# Patient Record
Sex: MALE | Race: WHITE | NOT HISPANIC OR LATINO
[De-identification: names, ages, dates, MRNs, and addresses within clinical notes are randomized per-mention and may not be internally consistent; named-entity substitution may affect disease eponyms.]

---

## 2018-08-28 ENCOUNTER — RECORD ABSTRACTING (OUTPATIENT)
Age: 28
End: 2018-08-28

## 2018-08-28 DIAGNOSIS — Z79.4 LONG TERM (CURRENT) USE OF INSULIN: ICD-10-CM

## 2018-08-28 DIAGNOSIS — Z78.9 OTHER SPECIFIED HEALTH STATUS: ICD-10-CM

## 2018-08-28 DIAGNOSIS — F17.210 NICOTINE DEPENDENCE, CIGARETTES, UNCOMPLICATED: ICD-10-CM

## 2018-08-28 DIAGNOSIS — F17.200 NICOTINE DEPENDENCE, UNSPECIFIED, UNCOMPLICATED: ICD-10-CM

## 2018-08-28 RX ORDER — BLOOD-GLUCOSE METER
KIT MISCELLANEOUS
Refills: 0 | Status: ACTIVE | COMMUNITY

## 2018-08-28 RX ORDER — LANCETS
EACH MISCELLANEOUS
Refills: 0 | Status: ACTIVE | COMMUNITY

## 2018-09-13 ENCOUNTER — APPOINTMENT (OUTPATIENT)
Dept: ENDOCRINOLOGY | Facility: CLINIC | Age: 28
End: 2018-09-13
Payer: COMMERCIAL

## 2018-09-13 VITALS
WEIGHT: 168 LBS | HEIGHT: 68 IN | HEART RATE: 79 BPM | SYSTOLIC BLOOD PRESSURE: 130 MMHG | BODY MASS INDEX: 25.46 KG/M2 | DIASTOLIC BLOOD PRESSURE: 80 MMHG

## 2018-09-13 DIAGNOSIS — F12.188 CANNABIS ABUSE WITH OTHER CANNABIS-INDUCED DISORDER: ICD-10-CM

## 2018-09-13 LAB — GLUCOSE BLDC GLUCOMTR-MCNC: 241

## 2018-09-13 PROCEDURE — 82962 GLUCOSE BLOOD TEST: CPT

## 2018-09-13 PROCEDURE — 99214 OFFICE O/P EST MOD 30 MIN: CPT | Mod: 25

## 2018-09-13 RX ORDER — NAPROXEN SODIUM 220 MG
220 TABLET ORAL
Refills: 0 | Status: DISCONTINUED | COMMUNITY
End: 2018-09-13

## 2018-12-10 ENCOUNTER — RX RENEWAL (OUTPATIENT)
Age: 28
End: 2018-12-10

## 2019-01-22 ENCOUNTER — APPOINTMENT (OUTPATIENT)
Dept: ENDOCRINOLOGY | Facility: CLINIC | Age: 29
End: 2019-01-22

## 2019-03-11 ENCOUNTER — RX RENEWAL (OUTPATIENT)
Age: 29
End: 2019-03-11

## 2019-04-16 ENCOUNTER — APPOINTMENT (OUTPATIENT)
Dept: ENDOCRINOLOGY | Facility: CLINIC | Age: 29
End: 2019-04-16
Payer: COMMERCIAL

## 2019-04-16 VITALS
BODY MASS INDEX: 26.52 KG/M2 | OXYGEN SATURATION: 98 % | WEIGHT: 175 LBS | HEIGHT: 68 IN | DIASTOLIC BLOOD PRESSURE: 80 MMHG | HEART RATE: 79 BPM | SYSTOLIC BLOOD PRESSURE: 130 MMHG

## 2019-04-16 DIAGNOSIS — E55.9 VITAMIN D DEFICIENCY, UNSPECIFIED: ICD-10-CM

## 2019-04-16 LAB
GLUCOSE BLDC GLUCOMTR-MCNC: 193
HBA1C MFR BLD: 8.2

## 2019-04-16 PROCEDURE — 99214 OFFICE O/P EST MOD 30 MIN: CPT | Mod: 25

## 2019-04-16 PROCEDURE — 36415 COLL VENOUS BLD VENIPUNCTURE: CPT

## 2019-04-16 PROCEDURE — 82962 GLUCOSE BLOOD TEST: CPT

## 2019-04-16 NOTE — PHYSICAL EXAM
[No Acute Distress] : no acute distress [Healthy Appearance] : healthy appearance [Normal Sclera/Conjunctiva] : normal sclera/conjunctiva [No Proptosis] : no proptosis [No LAD] : no lymphadenopathy [No Neck Mass] : no neck mass was observed [Thyroid Not Enlarged] : the thyroid was not enlarged [No Thyroid Nodules] : there were no palpable thyroid nodules [Clear to Auscultation] : lungs were clear to auscultation bilaterally [Normal Rate and Effort] : normal respiratory rhythm and effort [Normal Rate] : heart rate was normal  [Normal S1, S2] : normal S1 and S2 [Regular Rhythm] : with a regular rhythm [Murmurs] : no murmurs [Normal Gait] : normal gait [Normal Insight/Judgement] : insight and judgment were intact [Normal Mood] : the mood was normal [Normal Affect] : the affect was normal [Acanthosis Nigricans] : no acanthosis nigricans

## 2019-04-16 NOTE — ASSESSMENT
[FreeTextEntry1] : 28 year old male with history of type 1 DM, HTN and hyperlipidemia.  Unable to asses glycemic control without SMBG data or labs. \par \par 1.  Type 1 DM-  check labs now  (drawn in office).   Continue current insulin regimen.  Discussed new CGM technology and patient will consider this.  \par 2.  HTN-  BP has improved, continue to monitor.\par 3.  Hyperlipidemia-  check lipids now.

## 2019-04-16 NOTE — REVIEW OF SYSTEMS
[Recent Weight Gain (___ Lbs)] : recent [unfilled] ~Ulb weight gain [Palpitations] : no palpitations [Nausea] : no nausea [Shortness Of Breath] : no shortness of breath [Polyuria] : no polyuria [Pain/Numbness of Digits] : no pain/numbness of digits

## 2019-04-16 NOTE — HISTORY OF PRESENT ILLNESS
[FreeTextEntry1] : Patient is seen today for a routine diabetic follow up.\par Quality:  type 1 DM\par Severity:  uncontrolled\par Duration of diabetes:  since 2001\par Associated Complications/ Symptoms:  hypoglycemia, admissions for DKA in the past\par Modifying Factors:  Better with insulin.\par \par Patient tests blood glucose 4 times per day.  \par No log or meter today.  Reports BG has been well controlled lately.  \par Did not bring log or meter today.\par CGM not well covered under current Rx plan.\par \par Current Diabetic Medication Regimen:\par Basaglar 25 units qAM\par Humalog IC 1:5,  ISF  1:25  target 100\par \par

## 2019-04-24 LAB
25(OH)D3 SERPL-MCNC: 34.3 NG/ML
ALBUMIN SERPL ELPH-MCNC: 5 G/DL
ALP BLD-CCNC: 108 U/L
ALT SERPL-CCNC: 45 U/L
ANION GAP SERPL CALC-SCNC: 16 MMOL/L
AST SERPL-CCNC: 25 U/L
BASOPHILS # BLD AUTO: 0.05 K/UL
BASOPHILS NFR BLD AUTO: 0.6 %
BILIRUB SERPL-MCNC: 1.3 MG/DL
BUN SERPL-MCNC: 14 MG/DL
CALCIUM SERPL-MCNC: 10.4 MG/DL
CHLORIDE SERPL-SCNC: 100 MMOL/L
CHOLEST SERPL-MCNC: 241 MG/DL
CHOLEST/HDLC SERPL: 3.9 RATIO
CO2 SERPL-SCNC: 25 MMOL/L
CREAT SERPL-MCNC: 0.65 MG/DL
CREAT SPEC-SCNC: 95 MG/DL
EOSINOPHIL # BLD AUTO: 0.12 K/UL
EOSINOPHIL NFR BLD AUTO: 1.4 %
ESTIMATED AVERAGE GLUCOSE: 246 MG/DL
GLUCOSE SERPL-MCNC: 211 MG/DL
HBA1C MFR BLD HPLC: 10.2 %
HCT VFR BLD CALC: 49 %
HDLC SERPL-MCNC: 62 MG/DL
HGB BLD-MCNC: 16.1 G/DL
IMM GRANULOCYTES NFR BLD AUTO: 0.2 %
LDLC SERPL CALC-MCNC: 154 MG/DL
LYMPHOCYTES # BLD AUTO: 2.52 K/UL
LYMPHOCYTES NFR BLD AUTO: 30.3 %
MAN DIFF?: NORMAL
MCHC RBC-ENTMCNC: 30.5 PG
MCHC RBC-ENTMCNC: 32.9 GM/DL
MCV RBC AUTO: 92.8 FL
MICROALBUMIN 24H UR DL<=1MG/L-MCNC: <1.2 MG/DL
MICROALBUMIN/CREAT 24H UR-RTO: NORMAL MG/G
MONOCYTES # BLD AUTO: 0.77 K/UL
MONOCYTES NFR BLD AUTO: 9.3 %
NEUTROPHILS # BLD AUTO: 4.84 K/UL
NEUTROPHILS NFR BLD AUTO: 58.2 %
PLATELET # BLD AUTO: 308 K/UL
POTASSIUM SERPL-SCNC: 4.8 MMOL/L
PROT SERPL-MCNC: 7.2 G/DL
RBC # BLD: 5.28 M/UL
RBC # FLD: 12.2 %
SODIUM SERPL-SCNC: 141 MMOL/L
TRIGL SERPL-MCNC: 125 MG/DL
TSH SERPL-ACNC: 0.39 UIU/ML
WBC # FLD AUTO: 8.32 K/UL

## 2019-07-17 ENCOUNTER — RX RENEWAL (OUTPATIENT)
Age: 29
End: 2019-07-17

## 2019-08-16 ENCOUNTER — MEDICATION RENEWAL (OUTPATIENT)
Age: 29
End: 2019-08-16

## 2019-08-20 ENCOUNTER — MEDICATION RENEWAL (OUTPATIENT)
Age: 29
End: 2019-08-20

## 2019-09-13 ENCOUNTER — RX RENEWAL (OUTPATIENT)
Age: 29
End: 2019-09-13

## 2019-10-09 ENCOUNTER — RX RENEWAL (OUTPATIENT)
Age: 29
End: 2019-10-09

## 2019-10-14 ENCOUNTER — APPOINTMENT (OUTPATIENT)
Dept: ENDOCRINOLOGY | Facility: CLINIC | Age: 29
End: 2019-10-14
Payer: COMMERCIAL

## 2019-10-14 VITALS
BODY MASS INDEX: 22.73 KG/M2 | DIASTOLIC BLOOD PRESSURE: 80 MMHG | WEIGHT: 150 LBS | SYSTOLIC BLOOD PRESSURE: 128 MMHG | HEIGHT: 68 IN | HEART RATE: 79 BPM

## 2019-10-14 LAB — GLUCOSE BLDC GLUCOMTR-MCNC: 183

## 2019-10-14 PROCEDURE — 99214 OFFICE O/P EST MOD 30 MIN: CPT | Mod: 25

## 2019-10-14 PROCEDURE — 82962 GLUCOSE BLOOD TEST: CPT

## 2019-10-14 NOTE — HISTORY OF PRESENT ILLNESS
[FreeTextEntry1] : Patient is seen today for a routine diabetic follow up.\par Quality:  type 1 DM\par Severity:  uncontrolled\par Duration of diabetes:  since 2001\par Associated Complications/ Symptoms:  hypoglycemia, admissions for DKA in the past\par Modifying Factors:  Better with insulin.\par \par Patient tests blood glucose 4 times per day.  \par No log or meter today.   Denies any hypoglycemia.  \par CGM not well covered under current Rx plan.\par \par Current Diabetic Medication Regimen:\par Basaglar 25 units qAM\par Novolog IC 1:5,  ISF  1:25  target 100\par \par Was hospitalized at [place] recently for intractable nausea and vomiting.  No DKA.  Though to be related to Marijuana.  \par

## 2019-10-14 NOTE — PHYSICAL EXAM
[No Acute Distress] : no acute distress [Normal Sclera/Conjunctiva] : normal sclera/conjunctiva [Healthy Appearance] : healthy appearance [No Proptosis] : no proptosis [No Neck Mass] : no neck mass was observed [No LAD] : no lymphadenopathy [Thyroid Not Enlarged] : the thyroid was not enlarged [No Thyroid Nodules] : there were no palpable thyroid nodules [Clear to Auscultation] : lungs were clear to auscultation bilaterally [Normal Rate and Effort] : normal respiratory rhythm and effort [Normal Rate] : heart rate was normal  [Normal S1, S2] : normal S1 and S2 [Regular Rhythm] : with a regular rhythm [Murmurs] : no murmurs [Normal Gait] : normal gait [Right Foot Was Examined] : right foot ~C was examined [Left Foot Was Examined] : left foot ~C was examined [Normal] : normal [2+] : 2+ in the dorsalis pedis [Normal Affect] : the affect was normal [Normal Insight/Judgement] : insight and judgment were intact [Normal Mood] : the mood was normal [Acanthosis Nigricans] : no acanthosis nigricans [Diminished Throughout Both Feet] : normal tactile sensation with monofilament testing throughout both feet

## 2019-10-14 NOTE — REVIEW OF SYSTEMS
[Recent Weight Loss (___ Lbs)] : recent [unfilled] ~Ulb weight loss [Chest Pain] : no chest pain [Palpitations] : no palpitations [Nausea] : no nausea [Shortness Of Breath] : no shortness of breath [Pain/Numbness of Digits] : no pain/numbness of digits

## 2019-10-14 NOTE — ASSESSMENT
[FreeTextEntry1] : 29 year old male with history of type 1 DM, HTN and hyperlipidemia.   His glycemic control is suboptimal.\par \par 1.  Type 1 DM-  check labs now.  needs to bring glucometer to office visits for review and better titration of insulin.  Recommended eye exam.  \par 2.  HTN-  BP has improved, continue to monitor.\par 3.  Hyperlipidemia-  check lipids now. Rec lifestyle modification given his young age.  Will likely need statin therapy in the future.

## 2019-10-22 ENCOUNTER — RX RENEWAL (OUTPATIENT)
Age: 29
End: 2019-10-22

## 2019-10-28 ENCOUNTER — TRANSCRIPTION ENCOUNTER (OUTPATIENT)
Age: 29
End: 2019-10-28

## 2020-01-13 ENCOUNTER — APPOINTMENT (OUTPATIENT)
Dept: ENDOCRINOLOGY | Facility: CLINIC | Age: 30
End: 2020-01-13

## 2020-02-18 ENCOUNTER — RX RENEWAL (OUTPATIENT)
Age: 30
End: 2020-02-18

## 2020-04-13 ENCOUNTER — APPOINTMENT (OUTPATIENT)
Dept: ENDOCRINOLOGY | Facility: CLINIC | Age: 30
End: 2020-04-13

## 2020-04-27 ENCOUNTER — TRANSCRIPTION ENCOUNTER (OUTPATIENT)
Age: 30
End: 2020-04-27

## 2020-04-29 RX ORDER — INSULIN ASPART 100 [IU]/ML
100 INJECTION, SOLUTION INTRAVENOUS; SUBCUTANEOUS
Qty: 1 | Refills: 0 | Status: DISCONTINUED | COMMUNITY
Start: 2019-08-20 | End: 2020-04-29

## 2020-06-02 ENCOUNTER — APPOINTMENT (OUTPATIENT)
Dept: ENDOCRINOLOGY | Facility: CLINIC | Age: 30
End: 2020-06-02
Payer: MEDICAID

## 2020-06-02 PROCEDURE — 99213 OFFICE O/P EST LOW 20 MIN: CPT | Mod: 95

## 2020-06-02 NOTE — ASSESSMENT
[FreeTextEntry1] : 29 year old male with history of type 1 DM, HTN and hyperlipidemia.    His glycemic control is suboptimal. \par \par Will order freestyle Jocelyne CGM.\par CGM medical necessity statement:\par Patient tests his blood glucose 4 or more times a day and injects insulin 3 or more times per day.\par He has been seen regularly at this office within the past 6 months.\par He requires frequent adjustments to his/her insulin regimen on the basis of therapeutic CGM results.\par Recommended that on days of increased activity (Ie skateboarding) that he decrease basaglar to 20 units qAM to prevent hypoglycemia.  \par Check labs within the next month.  \par

## 2020-06-02 NOTE — PHYSICAL EXAM
[Healthy Appearance] : healthy appearance [No Acute Distress] : no acute distress [Normal Affect] : the affect was normal [Normal Mood] : the mood was normal [Normal Insight/Judgement] : insight and judgment were intact

## 2020-06-02 NOTE — HISTORY OF PRESENT ILLNESS
[Home] : at home, [unfilled] , at the time of the visit. [Medical Office: ([facility])___] : at the medical office located in  [Verbal consent obtained from patient] : the patient, [unfilled] [FreeTextEntry1] : Telehealth visit conducted due to COVID-19 Pandemic.\par Time started:  11:18AM\par Time Ended:  11: 29AM\par \par Follow up DM.\par Quality:  type 1 DM\par Severity:  uncontrolled\par Duration of diabetes:  since 2001\par Associated Complications/ Symptoms:  hypoglycemia, admissions for DKA in the past\par Modifying Factors:  Better with insulin.\par \par Patient tests blood glucose 4 times per day.  Has some nocturnal lows after skateboarding.  \par CGM was not covered well under insurance in the past.\par \par Current Diabetic Medication Regimen:\par Basaglar 25 units qAM\par Admelog IC 1:5,  ISF  1:25  target 100\par \par \par

## 2020-06-02 NOTE — REVIEW OF SYSTEMS
[Recent Weight Gain (___ Lbs)] : recent weight gain: [unfilled] lbs [Shortness Of Breath] : no shortness of breath [Palpitations] : no palpitations [Nausea] : no nausea

## 2020-06-04 ENCOUNTER — TRANSCRIPTION ENCOUNTER (OUTPATIENT)
Age: 30
End: 2020-06-04

## 2020-09-11 ENCOUNTER — RX RENEWAL (OUTPATIENT)
Age: 30
End: 2020-09-11

## 2020-11-02 ENCOUNTER — RX RENEWAL (OUTPATIENT)
Age: 30
End: 2020-11-02

## 2020-11-04 ENCOUNTER — RX RENEWAL (OUTPATIENT)
Age: 30
End: 2020-11-04

## 2020-11-06 ENCOUNTER — RX RENEWAL (OUTPATIENT)
Age: 30
End: 2020-11-06

## 2020-12-17 ENCOUNTER — TRANSCRIPTION ENCOUNTER (OUTPATIENT)
Age: 30
End: 2020-12-17

## 2021-01-22 ENCOUNTER — RX RENEWAL (OUTPATIENT)
Age: 31
End: 2021-01-22

## 2021-02-19 ENCOUNTER — NON-APPOINTMENT (OUTPATIENT)
Age: 31
End: 2021-02-19

## 2021-03-29 ENCOUNTER — RX RENEWAL (OUTPATIENT)
Age: 31
End: 2021-03-29

## 2021-04-23 ENCOUNTER — RX RENEWAL (OUTPATIENT)
Age: 31
End: 2021-04-23

## 2021-05-06 ENCOUNTER — RX RENEWAL (OUTPATIENT)
Age: 31
End: 2021-05-06

## 2021-06-18 ENCOUNTER — RX RENEWAL (OUTPATIENT)
Age: 31
End: 2021-06-18

## 2021-07-06 ENCOUNTER — TRANSCRIPTION ENCOUNTER (OUTPATIENT)
Age: 31
End: 2021-07-06

## 2021-07-30 ENCOUNTER — RX RENEWAL (OUTPATIENT)
Age: 31
End: 2021-07-30

## 2021-08-02 ENCOUNTER — TRANSCRIPTION ENCOUNTER (OUTPATIENT)
Age: 31
End: 2021-08-02

## 2021-08-09 ENCOUNTER — RX RENEWAL (OUTPATIENT)
Age: 31
End: 2021-08-09

## 2021-09-16 ENCOUNTER — APPOINTMENT (OUTPATIENT)
Dept: ENDOCRINOLOGY | Facility: CLINIC | Age: 31
End: 2021-09-16
Payer: MEDICAID

## 2021-09-16 DIAGNOSIS — Z00.00 ENCOUNTER FOR GENERAL ADULT MEDICAL EXAMINATION W/OUT ABNORMAL FINDINGS: ICD-10-CM

## 2021-09-16 PROCEDURE — 99214 OFFICE O/P EST MOD 30 MIN: CPT | Mod: 95

## 2021-09-16 NOTE — HISTORY OF PRESENT ILLNESS
[Home] : at home, [unfilled] , at the time of the visit. [Medical Office: ([facility])___] : at the medical office located in  [Verbal consent obtained from patient] : the patient, [unfilled] [FreeTextEntry1] : \par  \par Telehealth visit conducted due to Patient not feeling well with cough \par Time started: 8:37 AM\par Time Ended: 8:48 AM\par \par Follow up DM.\par Quality: type 1 DM\par Severity: uncontrolled\par Duration of diabetes: since 2001\par Associated Complications/ Symptoms: hypoglycemia, admissions for DKA in the past\par Modifying Factors: Better with insulin.\par \par Patient tests blood glucose 4 times per day. \par Was on vacation sugars were 160-180, he was preventing lows from occurring so under dosing insulin \par Sometimes has nocturnal lows when very active during the day, skateboards, does not check number, just eats to bring numbers up\par \par CGM was not covered well under insurance in the past, but recently got a letter they will start to be covered again\par \par Current Diabetic Medication Regimen:\par Semglee 25 units qAM\par Admelog IC 1:5, ISF 1:25 target 100\par \par Eye Exam: 2.5 years ago, overdue, no DR\par Foot Exam: numbness and tingling in feet, has splinter in heel, making podiatrist appt \par Diet: eats very well except when over treating lows in the middle of night \par \par \par HLD\par -Need updated labs \par Glucose Sensor Necessity: This patient with diabetes performs 4 glucose checks per day utilizing a home blood glucose monitor. The patient is treated with insulin via 4 injections daily . This patient requires frequent adjustments to their insulin treatment on the basis of therapeutic continuous glucose monitoring results by adjusting fixed insulin doses. In addition, the patient has been to our office for an evaluation of their diabetes control within the past 6 months.\par \par

## 2021-09-16 NOTE — HISTORY OF PRESENT ILLNESS
[Home] : at home, [unfilled] , at the time of the visit. [Medical Office: ([facility])___] : at the medical office located in  [Verbal consent obtained from patient] : the patient, [unfilled] [FreeTextEntry1] : \par  \par Telehealth visit conducted due to Patient not feeling well with cough \par Time started: 8:37 AM\par Time Ended: 8:48 AM\par \par Follow up DM.\par Quality: type 1 DM\par Severity: uncontrolled\par Duration of diabetes: since 2001\par Associated Complications/ Symptoms: hypoglycemia, admissions for DKA in the past\par Modifying Factors: Better with insulin.\par \par Patient tests blood glucose 4 times per day. \par Was on vacation sugars were 160-180, he was preventing lows from occurring so under dosing insulin \par Sometimes has nocturnal lows when very active during the day, skateboards, does not check number, just eats to bring numbers up\par \par CGM was not covered well under insurance in the past, but recently got a letter they will start to be covered again\par \par Current Diabetic Medication Regimen:\par Semglee 25 units qAM\par Admelog IC 1:5, ISF 1:25 target 100\par \par Eye Exam: 2.5 years ago, overdue, no DR\par Foot Exam: numbness and tingling in feet, has splinter in heel, making podiatrist appt \par Diet: eats very well except when over treating lows in the middle of night \par \par \par HLD\par -Need updated labs \par Glucose Sensor Necessity: This patient with diabetes performs 4 glucose checks per day utilizing a home blood glucose monitor. The patient is treated with insulin via 4 injections daily . This patient requires frequent adjustments to their insulin treatment on the basis of therapeutic continuous glucose monitoring results by adjusting fixed insulin doses. In addition, the patient has been to our office for an evaluation of their diabetes control within the past 6 months.\par \par

## 2021-09-16 NOTE — ASSESSMENT
[FreeTextEntry1] : 29 year old male with history of type 1 DM, HTN and hyperlipidemia. His glycemic control is suboptimal. \par \par T2DM\par -Will check which CGM is covered under insurance \par -CGM medical necessity statement:\par Patient tests his blood glucose 4 or more times a day and injects insulin 3 or more times per day.\par He has been seen regularly at this office within the past 6 months.\par He requires frequent adjustments to his/her insulin regimen on the basis of therapeutic CGM results.\par -Recommended that on days of increased activity (Ie skateboarding) that he decrease basaglar to 20 units qAM to prevent hypoglycemia. \par -Continue current regimen\par -Reviewed risk/complication of uncontrolled DM \par -Increase exercise as tolerated 5 days a week x 30 mins/day\par -Increase dietary efforts, low carb/low sugar\par -Repeat HgbA1C now  \par -Follow up with specialist including optho and podiatry \par \par HLD\par -repeat lipid now\par \par Patient due for updated blood work. Will mail script to Patient\par \par RTO in 3 months  [Diabetes Foot Care] : diabetes foot care [Long Term Vascular Complications] : long term vascular complications of diabetes [Importance of Diet and Exercise] : importance of diet and exercise to improve glycemic control, achieve weight loss and improve cardiovascular health [Retinopathy Screening] : Patient was referred to ophthalmology for retinopathy screening

## 2021-09-16 NOTE — REVIEW OF SYSTEMS
[Fatigue] : no fatigue [Recent Weight Gain (___ Lbs)] : no recent weight gain [Recent Weight Loss (___ Lbs)] : no recent weight loss [Visual Field Defect] : no visual field defect [Blurred Vision] : no blurred vision [Dysphagia] : no dysphagia [Neck Pain] : no neck pain [Chest Pain] : no chest pain [Palpitations] : no palpitations [Shortness Of Breath] : no shortness of breath [Polyuria] : no polyuria [Polydipsia] : no polydipsia

## 2021-09-29 ENCOUNTER — RX RENEWAL (OUTPATIENT)
Age: 31
End: 2021-09-29

## 2021-10-03 ENCOUNTER — RX RENEWAL (OUTPATIENT)
Age: 31
End: 2021-10-03

## 2021-11-15 ENCOUNTER — RX RENEWAL (OUTPATIENT)
Age: 31
End: 2021-11-15

## 2021-11-23 ENCOUNTER — TRANSCRIPTION ENCOUNTER (OUTPATIENT)
Age: 31
End: 2021-11-23

## 2021-12-01 ENCOUNTER — APPOINTMENT (OUTPATIENT)
Dept: ENDOCRINOLOGY | Facility: CLINIC | Age: 31
End: 2021-12-01
Payer: MEDICAID

## 2021-12-01 PROCEDURE — 99214 OFFICE O/P EST MOD 30 MIN: CPT | Mod: 95

## 2021-12-01 NOTE — REVIEW OF SYSTEMS
[Recent Weight Gain (___ Lbs)] : no recent weight gain [Recent Weight Loss (___ Lbs)] : no recent weight loss [Shortness Of Breath] : shortness of breath [Nausea] : no nausea

## 2021-12-01 NOTE — ASSESSMENT
[FreeTextEntry1] : 31 year old male with history of type 1 DM, HTN and hyperlipidemia.     His diabetes control is suboptimal related to noncompliance with recommendations.  \par \par 1.  T1DM-  explained the importance of routine laboratory monitoring in Type 1 DM.  Advised patient to have fasting labs ASAP.   Improve compliance with SMBG and improve compliance with bolus insulin.   Again encouraged patient to reconsider CGM.  \par 2. Hyperlipidemia-  repeat fasting lipids now.  Advised lifestyle modification.    \par  \par

## 2021-12-01 NOTE — HISTORY OF PRESENT ILLNESS
[Home] : at home, [unfilled] , at the time of the visit. [Medical Office: ([facility])___] : at the medical office located in  [Verbal consent obtained from patient] : the patient, [unfilled] [FreeTextEntry1] : Telehealth visit conducted.\par Time started:  11:05 AM\par Time Ended:  11:24 AM\par \par Follow up DM.\par Quality:  type 1 DM\par Severity:  uncontrolled\par Duration of diabetes:  since 2001\par Associated Complications/ Symptoms:  hypoglycemia, admissions for DKA in the past\par Modifying Factors:  Better with insulin.\par \par Patient tests blood glucose 4 times per day.    Has been lax with testing lately.  \par Does not want to wear CGM (tried Dexcom in the past).   \par \par Current Diabetic Medication Regimen:\par Semglee 25 units qAM\par Admelog IC 1:5,  ISF  1:25  target 100\par \par Was diagnosed with COVID-19 one month ago after receiving his booster shot.   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  Having some memory fog since COVID now.  \par Has still not had blood work done, despite multiple requests.   \par \par \par

## 2021-12-14 ENCOUNTER — RX RENEWAL (OUTPATIENT)
Age: 31
End: 2021-12-14

## 2021-12-29 ENCOUNTER — RX RENEWAL (OUTPATIENT)
Age: 31
End: 2021-12-29

## 2022-05-02 ENCOUNTER — RX RENEWAL (OUTPATIENT)
Age: 32
End: 2022-05-02

## 2022-05-11 ENCOUNTER — RX RENEWAL (OUTPATIENT)
Age: 32
End: 2022-05-11

## 2022-05-11 RX ORDER — BLOOD SUGAR DIAGNOSTIC
STRIP MISCELLANEOUS
Qty: 200 | Refills: 0 | Status: ACTIVE | COMMUNITY
Start: 2021-05-06 | End: 1900-01-01

## 2022-05-23 ENCOUNTER — TRANSCRIPTION ENCOUNTER (OUTPATIENT)
Age: 32
End: 2022-05-23

## 2022-07-01 ENCOUNTER — RX RENEWAL (OUTPATIENT)
Age: 32
End: 2022-07-01

## 2022-07-05 ENCOUNTER — TRANSCRIPTION ENCOUNTER (OUTPATIENT)
Age: 32
End: 2022-07-05

## 2022-10-05 ENCOUNTER — TRANSCRIPTION ENCOUNTER (OUTPATIENT)
Age: 32
End: 2022-10-05

## 2022-10-05 RX ORDER — PEN NEEDLE, DIABETIC 29 G X1/2"
31G X 8 MM NEEDLE, DISPOSABLE MISCELLANEOUS
Qty: 500 | Refills: 1 | Status: DISCONTINUED | COMMUNITY
Start: 2018-12-10 | End: 2022-10-05

## 2022-11-02 RX ORDER — INSULIN GLARGINE 100 [IU]/ML
100 INJECTION, SOLUTION SUBCUTANEOUS
Qty: 2 | Refills: 1 | Status: ACTIVE | COMMUNITY
Start: 2019-03-11 | End: 1900-01-01

## 2023-01-30 ENCOUNTER — APPOINTMENT (OUTPATIENT)
Dept: ENDOCRINOLOGY | Facility: CLINIC | Age: 33
End: 2023-01-30
Payer: MEDICAID

## 2023-01-30 VITALS
HEIGHT: 68 IN | HEART RATE: 77 BPM | DIASTOLIC BLOOD PRESSURE: 82 MMHG | SYSTOLIC BLOOD PRESSURE: 124 MMHG | OXYGEN SATURATION: 98 % | WEIGHT: 159 LBS | BODY MASS INDEX: 24.1 KG/M2

## 2023-01-30 LAB — GLUCOSE BLDC GLUCOMTR-MCNC: 369

## 2023-01-30 PROCEDURE — 82962 GLUCOSE BLOOD TEST: CPT

## 2023-01-30 PROCEDURE — 99214 OFFICE O/P EST MOD 30 MIN: CPT | Mod: 25

## 2023-01-30 RX ORDER — INSULIN GLARGINE 100 [IU]/ML
100 INJECTION, SOLUTION SUBCUTANEOUS
Qty: 2 | Refills: 1 | Status: DISCONTINUED | COMMUNITY
Start: 2021-04-20 | End: 2023-01-30

## 2023-01-30 NOTE — HISTORY OF PRESENT ILLNESS
[FreeTextEntry1] : Follow up of T1DM and hyperlipidemia.\par \par Quality:  type 1 DM\par Severity:  uncontrolled\par Duration of diabetes:  since 2001\par Associated Complications/ Symptoms:  hypoglycemia, admissions for DKA in the past\par Modifying Factors:  Better with insulin.\par \par Patient tests blood glucose 4 times per day.     \par Tried Dexcom in the past, but had trouble with insurance coverage.  \par \par Current Diabetic Medication Regimen:\par Insulin Glargine 25 units qAM\par Admelog IC 1:5,  ISF  1:25  target 100-  has been missing doses at times.  \par \par Recently got a new job at a [employer] in [place]. \par \par  \par \par \par

## 2023-01-30 NOTE — REVIEW OF SYSTEMS
[Recent Weight Gain (___ Lbs)] : no recent weight gain [Recent Weight Loss (___ Lbs)] : no recent weight loss [Palpitations] : no palpitations [Shortness Of Breath] : no shortness of breath [Nausea] : no nausea

## 2023-01-30 NOTE — PHYSICAL EXAM
[Healthy Appearance] : healthy appearance [No Acute Distress] : no acute distress [Normal Sclera/Conjunctiva] : normal sclera/conjunctiva [No Proptosis] : no proptosis [Normal Affect] : the affect was normal [Normal Insight/Judgement] : insight and judgment were intact [Normal Mood] : the mood was normal [No Neck Mass] : no neck mass was observed [No LAD] : no lymphadenopathy [Supple] : the neck was supple [Thyroid Not Enlarged] : the thyroid was not enlarged [No Thyroid Nodules] : no palpable thyroid nodules [No Respiratory Distress] : no respiratory distress [Clear to Auscultation] : lungs were clear to auscultation bilaterally [Normal S1, S2] : normal S1 and S2 [No Murmurs] : no murmurs [Normal Rate] : heart rate was normal [Regular Rhythm] : with a regular rhythm [No Edema] : no peripheral edema [Normal Gait] : normal gait [Acanthosis Nigricans] : no acanthosis nigricans

## 2023-01-30 NOTE — ASSESSMENT
[FreeTextEntry1] : 32 year old male with history of type 1 DM and hyperlipidemia here for follow up.  His diabetes remain uncontrolled related to his noncompliance with prandial insulin and diet.   \par \par 1.  T1DM-    Resume CGM with Dexcom as this will allow for more effective adjustment of insulin doses.  Improve compliance with prandial insulin.   Check labs now.  \par \par CGM medical necessity statement:\par Patient tests his blood glucose 4 or more times a day and injects insulin 3 or more times per day.\par He is seen regularly at this office within 6 month intervals.\par He requires frequent adjustments to his/her insulin regimen on the basis of CGM results (using ISF of 1:25).\par \par 2. Hyperlipidemia-   Check lipids now.  May benefit from statin therapy if not improved with diet modification.   \par \par Follow up in 4 months.  \par

## 2023-03-22 ENCOUNTER — APPOINTMENT (OUTPATIENT)
Dept: ENDOCRINOLOGY | Facility: CLINIC | Age: 33
End: 2023-03-22
Payer: MEDICAID

## 2023-03-22 PROCEDURE — G0108 DIAB MANAGE TRN  PER INDIV: CPT

## 2023-04-10 ENCOUNTER — TRANSCRIPTION ENCOUNTER (OUTPATIENT)
Age: 33
End: 2023-04-10

## 2023-04-10 RX ORDER — PEN NEEDLE, DIABETIC 29 G X1/2"
31G X 8 MM NEEDLE, DISPOSABLE MISCELLANEOUS
Qty: 4 | Refills: 0 | Status: ACTIVE | COMMUNITY
Start: 1900-01-01 | End: 1900-01-01

## 2023-05-09 ENCOUNTER — TRANSCRIPTION ENCOUNTER (OUTPATIENT)
Age: 33
End: 2023-05-09

## 2023-05-09 RX ORDER — INSULIN PMP CART,AUT,G6/7,CNTR
EACH SUBCUTANEOUS
Qty: 1 | Refills: 0 | Status: DISCONTINUED | COMMUNITY
Start: 2023-03-24 | End: 2023-05-09

## 2023-05-09 RX ORDER — INSULIN PMP CART,AUT,G6/7,CNTR
EACH SUBCUTANEOUS
Qty: 1 | Refills: 0 | Status: ACTIVE | COMMUNITY
Start: 2023-05-09 | End: 1900-01-01

## 2023-05-09 RX ORDER — INSULIN PMP CART,AUT,G6/7,CNTR
EACH SUBCUTANEOUS
Qty: 3 | Refills: 5 | Status: DISCONTINUED | COMMUNITY
Start: 2023-03-24 | End: 2023-05-09

## 2023-05-15 ENCOUNTER — APPOINTMENT (OUTPATIENT)
Dept: ENDOCRINOLOGY | Facility: CLINIC | Age: 33
End: 2023-05-15
Payer: COMMERCIAL

## 2023-05-15 PROCEDURE — P0004: CPT

## 2023-05-24 LAB
HBA1C MFR BLD HPLC: 10.4
LDLC SERPL DIRECT ASSAY-MCNC: 137
MICROALBUMIN/CREAT 24H UR-RTO: 12

## 2023-05-25 ENCOUNTER — APPOINTMENT (OUTPATIENT)
Dept: ENDOCRINOLOGY | Facility: CLINIC | Age: 33
End: 2023-05-25
Payer: MEDICAID

## 2023-05-25 VITALS
DIASTOLIC BLOOD PRESSURE: 88 MMHG | WEIGHT: 160 LBS | HEIGHT: 68 IN | SYSTOLIC BLOOD PRESSURE: 138 MMHG | TEMPERATURE: 98.7 F | OXYGEN SATURATION: 98 % | BODY MASS INDEX: 24.25 KG/M2 | HEART RATE: 85 BPM

## 2023-05-25 DIAGNOSIS — I10 ESSENTIAL (PRIMARY) HYPERTENSION: ICD-10-CM

## 2023-05-25 LAB — GLUCOSE BLDC GLUCOMTR-MCNC: 245

## 2023-05-25 PROCEDURE — 99214 OFFICE O/P EST MOD 30 MIN: CPT | Mod: 25

## 2023-05-25 PROCEDURE — 82962 GLUCOSE BLOOD TEST: CPT

## 2023-05-25 PROCEDURE — 95251 CONT GLUC MNTR ANALYSIS I&R: CPT

## 2023-05-25 RX ORDER — FLASH GLUCOSE SENSOR
KIT MISCELLANEOUS
Qty: 2 | Refills: 5 | Status: DISCONTINUED | COMMUNITY
Start: 2020-06-02 | End: 2023-05-25

## 2023-05-25 NOTE — HISTORY OF PRESENT ILLNESS
[FreeTextEntry1] : Follow up of T1DM and hyperlipidemia.\par \par Quality:  type 1 DM\par Severity:  uncontrolled\par Duration of diabetes:  since 2001\par Associated Complications/ Symptoms:  hypoglycemia, admissions for DKA in the past\par Modifying Factors:  Better with insulin.\par \par SMBG:  rpiro to CGM, was testing blood glucose 4 times per day.     \par Currently using Dexcom G6:  Average BG is 153 mg/dl with SD of 61.   72% of values are within target, 3 % below target and 25% above target.  Having some hypoglycemic events in the late evening and then some rebound hyperglycemia overnight.   \par \par Current Diabetic Medication Regimen:\par Started on Omnipod 5 insulin pump a few weeks ago (using Admelog insulin).   \par \par  \par \par  \par \par \par

## 2023-05-25 NOTE — ASSESSMENT
[FreeTextEntry1] : 32 year old male with history of type 1 DM and hyperlipidemia here for follow up.  His diabetes control is improving on hybrid closed loop insulin pump therapy.   \par \par 1.  T1DM-     Increase ISF at 6:00PM to 1:35 to prevent hypoglycemia.  Continue all other insulin pump settings.  Discussed proper treatment of lows to avoid rebound hyperglycemia.  Repeat full lab panel prior to next visit.   \par \par CGM medical necessity statement:\par Patient tests his blood glucose 4 or more times a day and injects insulin 3 or more times per day.\par He is seen regularly at this office within 6 month intervals.\par He requires frequent adjustments to his/her insulin regimen on the basis of CGM results (using ISF of 1:25).\par \par 2. Hyperlipidemia-   Would benefit from statin therapy.  Will first check lipid profile.   \par \par Follow up in 4 months.  \par

## 2023-06-26 RX ORDER — INSULIN LISPRO 100 U/ML
100 INJECTION, SOLUTION SUBCUTANEOUS
Qty: 30 | Refills: 3 | Status: COMPLETED | COMMUNITY
Start: 2019-07-17 | End: 2023-06-26

## 2023-09-07 ENCOUNTER — APPOINTMENT (OUTPATIENT)
Dept: ENDOCRINOLOGY | Facility: CLINIC | Age: 33
End: 2023-09-07
Payer: MEDICAID

## 2023-09-07 VITALS
HEIGHT: 68 IN | WEIGHT: 167 LBS | DIASTOLIC BLOOD PRESSURE: 80 MMHG | BODY MASS INDEX: 25.31 KG/M2 | OXYGEN SATURATION: 98 % | HEART RATE: 74 BPM | SYSTOLIC BLOOD PRESSURE: 120 MMHG
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
## 2023-09-07 LAB — GLUCOSE BLDC GLUCOMTR-MCNC: 157

## 2023-09-07 PROCEDURE — 95251 CONT GLUC MNTR ANALYSIS I&R: CPT

## 2023-09-07 PROCEDURE — 82962 GLUCOSE BLOOD TEST: CPT

## 2023-09-07 PROCEDURE — 99214 OFFICE O/P EST MOD 30 MIN: CPT

## 2023-09-07 NOTE — HISTORY OF PRESENT ILLNESS
[FreeTextEntry1] : Follow up of T1DM and hyperlipidemia.  Quality:  type 1 DM Severity:  uncontrolled Duration of diabetes:  since 2001 Associated Complications/ Symptoms:  hypoglycemia, admissions for DKA in the past Modifying Factors:  Better with insulin.  SMBG:  prior to CGM, was testing blood glucose 4 times per day.      Currently using Dexcom G6:  Average BG is 159 mg/dl with SD of 52.   70% of values are within target, 2 % below target and 28% above target.  Having some hypoglycemic events in the late evening and then some rebound hyperglycemia overnight.   Has occasional postprandial hyperglycemia.    Current Diabetic Medication Regimen: Started on Omnipod 5 insulin pump   (using Lispro insulin).

## 2023-09-07 NOTE — ASSESSMENT
[FreeTextEntry1] : 33 year old male with history of type 1 DM and hyperlipidemia here for follow up.  His diabetes is well controlled using hybrid closed loop pump therapy.    1.  T1DM-      Check labs now.    Continue current insulin pump settings.     CGM medical necessity statement: Patient tests his blood glucose 4 or more times a day and injects insulin 3 or more times per day. He is seen regularly at this office within 6 month intervals. He requires frequent adjustments to his/her insulin regimen on the basis of CGM results (using ISF of 1:25).  2. Hyperlipidemia-   Would benefit from statin therapy.  Check lipids and consider prescribing based on review of results.    3. Tick bite recently-  check lyme panel.     Follow up in 4 months.

## 2023-12-06 RX ORDER — INSULIN PMP CART,AUT,G6/7,CNTR
EACH SUBCUTANEOUS
Qty: 3 | Refills: 5 | Status: ACTIVE | COMMUNITY
Start: 2023-05-09 | End: 1900-01-01

## 2024-01-02 ENCOUNTER — RX RENEWAL (OUTPATIENT)
Age: 34
End: 2024-01-02

## 2024-01-12 ENCOUNTER — APPOINTMENT (OUTPATIENT)
Dept: ENDOCRINOLOGY | Facility: CLINIC | Age: 34
End: 2024-01-12
Payer: COMMERCIAL

## 2024-01-12 VITALS
HEART RATE: 70 BPM | BODY MASS INDEX: 26.67 KG/M2 | SYSTOLIC BLOOD PRESSURE: 140 MMHG | RESPIRATION RATE: 14 BRPM | OXYGEN SATURATION: 99 % | HEIGHT: 68 IN | TEMPERATURE: 98 F | DIASTOLIC BLOOD PRESSURE: 80 MMHG | WEIGHT: 176 LBS

## 2024-01-12 DIAGNOSIS — E78.2 MIXED HYPERLIPIDEMIA: ICD-10-CM

## 2024-01-12 DIAGNOSIS — E10.65 TYPE 1 DIABETES MELLITUS WITH HYPERGLYCEMIA: ICD-10-CM

## 2024-01-12 PROCEDURE — 95251 CONT GLUC MNTR ANALYSIS I&R: CPT

## 2024-01-12 PROCEDURE — 99214 OFFICE O/P EST MOD 30 MIN: CPT | Mod: 25

## 2024-01-12 NOTE — HISTORY OF PRESENT ILLNESS
[FreeTextEntry1] : Follow up of T1DM and hyperlipidemia.  Quality:  type 1 DM Severity:  uncontrolled Duration of diabetes:  since 2001 Associated Complications/ Symptoms:  hypoglycemia, admissions for DKA in the past Modifying Factors:  Better with insulin.  SMBG:  prior to CGM, was testing blood glucose 4 times per day.      Currently using Dexcom G6:  Average BG is 151 mg/dl with CV of 30%.   74% of values are within target, 1 % below target and 25% above target.  Overall, improving BG control since last visit.    Current Diabetic Medication Regimen: Started on Omnipod 5 insulin pump   (using Lispro insulin).

## 2024-01-12 NOTE — ASSESSMENT
[FreeTextEntry1] : 33 year old male with history of type 1 DM and hyperlipidemia here for follow up. His diabetes seems well controlled based on review of CGM data, but need labs to confirm.    1. T1DM-   Check A1c now. Will lower BG correction threshold to 140 mg/dl, otherwise continue current insulin pump settings.  CGM medical necessity statement: Patient tests his blood glucose 4 or more times a day and injects insulin 3 or more times per day. He is seen regularly at this office within 6 month intervals. He requires frequent adjustments to his/her insulin regimen on the basis of CGM results (using ISF of 1:25).  2. Hyperlipidemia-  Check lipids now.  Would likely benefit from statin therapy.    Follow up in 4 months.

## 2024-01-16 ENCOUNTER — TRANSCRIPTION ENCOUNTER (OUTPATIENT)
Age: 34
End: 2024-01-16

## 2024-01-16 LAB
ALBUMIN SERPL ELPH-MCNC: 4.9 G/DL
ALP BLD-CCNC: 73 U/L
ALT SERPL-CCNC: 23 U/L
ANION GAP SERPL CALC-SCNC: 12 MMOL/L
AST SERPL-CCNC: 20 U/L
BASOPHILS # BLD AUTO: 0.04 K/UL
BASOPHILS NFR BLD AUTO: 0.6 %
BILIRUB SERPL-MCNC: 1 MG/DL
BUN SERPL-MCNC: 18 MG/DL
CALCIUM SERPL-MCNC: 9.5 MG/DL
CHLORIDE SERPL-SCNC: 100 MMOL/L
CHOLEST SERPL-MCNC: 210 MG/DL
CO2 SERPL-SCNC: 28 MMOL/L
CREAT SERPL-MCNC: 0.73 MG/DL
CREAT SPEC-SCNC: 196 MG/DL
EGFR: 122 ML/MIN/1.73M2
EOSINOPHIL # BLD AUTO: 0.1 K/UL
EOSINOPHIL NFR BLD AUTO: 1.4 %
ESTIMATED AVERAGE GLUCOSE: 160 MG/DL
GLUCOSE SERPL-MCNC: 240 MG/DL
HBA1C MFR BLD HPLC: 7.2 %
HCT VFR BLD CALC: 50.8 %
HDLC SERPL-MCNC: 54 MG/DL
HGB BLD-MCNC: 16.4 G/DL
IMM GRANULOCYTES NFR BLD AUTO: 0.3 %
LDLC SERPL CALC-MCNC: 141 MG/DL
LYMPHOCYTES # BLD AUTO: 2.22 K/UL
LYMPHOCYTES NFR BLD AUTO: 31.4 %
MAN DIFF?: NORMAL
MCHC RBC-ENTMCNC: 28.8 PG
MCHC RBC-ENTMCNC: 32.3 GM/DL
MCV RBC AUTO: 89.3 FL
MICROALBUMIN 24H UR DL<=1MG/L-MCNC: 2.1 MG/DL
MICROALBUMIN/CREAT 24H UR-RTO: 11 MG/G
MONOCYTES # BLD AUTO: 0.58 K/UL
MONOCYTES NFR BLD AUTO: 8.2 %
NEUTROPHILS # BLD AUTO: 4.12 K/UL
NEUTROPHILS NFR BLD AUTO: 58.1 %
NONHDLC SERPL-MCNC: 156 MG/DL
PLATELET # BLD AUTO: 318 K/UL
POTASSIUM SERPL-SCNC: 4.5 MMOL/L
PROT SERPL-MCNC: 7.1 G/DL
RBC # BLD: 5.69 M/UL
RBC # FLD: 12.8 %
SODIUM SERPL-SCNC: 140 MMOL/L
TRIGL SERPL-MCNC: 83 MG/DL
TSH SERPL-ACNC: 0.59 UIU/ML
WBC # FLD AUTO: 7.08 K/UL

## 2024-02-01 ENCOUNTER — TRANSCRIPTION ENCOUNTER (OUTPATIENT)
Age: 34
End: 2024-02-01

## 2024-03-26 RX ORDER — INSULIN LISPRO 100 [IU]/ML
100 INJECTION, SOLUTION INTRAVENOUS; SUBCUTANEOUS
Qty: 30 | Refills: 5 | Status: DISCONTINUED | COMMUNITY
Start: 2023-05-09 | End: 2024-03-26

## 2024-04-03 RX ORDER — INSULIN ASPART 100 [IU]/ML
100 INJECTION, SOLUTION INTRAVENOUS; SUBCUTANEOUS
Qty: 9 | Refills: 1 | Status: ACTIVE | COMMUNITY
Start: 2024-03-26

## 2024-05-08 RX ORDER — BLOOD-GLUCOSE SENSOR
EACH MISCELLANEOUS
Qty: 3 | Refills: 1 | Status: ACTIVE | COMMUNITY
Start: 2024-05-07 | End: 1900-01-01

## 2024-05-08 RX ORDER — BLOOD-GLUCOSE TRANSMITTER
EACH MISCELLANEOUS
Qty: 1 | Refills: 1 | Status: ACTIVE | COMMUNITY
Start: 2024-05-07 | End: 1900-01-01

## 2024-05-09 RX ORDER — BLOOD-GLUCOSE SENSOR
EACH MISCELLANEOUS
Qty: 3 | Refills: 1 | Status: ACTIVE | COMMUNITY
Start: 2023-04-07

## 2024-05-09 RX ORDER — BLOOD-GLUCOSE TRANSMITTER
EACH MISCELLANEOUS
Qty: 1 | Refills: 1 | Status: ACTIVE | COMMUNITY
Start: 2023-04-07

## 2024-05-23 ENCOUNTER — APPOINTMENT (OUTPATIENT)
Dept: ENDOCRINOLOGY | Facility: CLINIC | Age: 34
End: 2024-05-23

## 2024-07-17 ENCOUNTER — RX RENEWAL (OUTPATIENT)
Age: 34
End: 2024-07-17

## 2024-10-08 ENCOUNTER — TRANSCRIPTION ENCOUNTER (OUTPATIENT)
Age: 34
End: 2024-10-08

## 2024-10-24 ENCOUNTER — NON-APPOINTMENT (OUTPATIENT)
Age: 34
End: 2024-10-24

## 2024-10-24 ENCOUNTER — APPOINTMENT (OUTPATIENT)
Dept: ENDOCRINOLOGY | Facility: CLINIC | Age: 34
End: 2024-10-24
Payer: COMMERCIAL

## 2024-10-24 VITALS
OXYGEN SATURATION: 99 % | TEMPERATURE: 98 F | RESPIRATION RATE: 14 BRPM | BODY MASS INDEX: 26.52 KG/M2 | HEIGHT: 68 IN | HEART RATE: 70 BPM | DIASTOLIC BLOOD PRESSURE: 80 MMHG | SYSTOLIC BLOOD PRESSURE: 124 MMHG | WEIGHT: 175 LBS

## 2024-10-24 DIAGNOSIS — E78.2 MIXED HYPERLIPIDEMIA: ICD-10-CM

## 2024-10-24 DIAGNOSIS — E10.65 TYPE 1 DIABETES MELLITUS WITH HYPERGLYCEMIA: ICD-10-CM

## 2024-10-24 PROCEDURE — 99214 OFFICE O/P EST MOD 30 MIN: CPT

## 2024-10-24 PROCEDURE — 95251 CONT GLUC MNTR ANALYSIS I&R: CPT

## 2024-10-24 RX ORDER — ATORVASTATIN CALCIUM 10 MG/1
10 TABLET, FILM COATED ORAL
Qty: 90 | Refills: 1 | Status: ACTIVE | COMMUNITY
Start: 2024-10-24 | End: 1900-01-01

## 2025-01-06 ENCOUNTER — RX RENEWAL (OUTPATIENT)
Age: 35
End: 2025-01-06

## 2025-01-24 ENCOUNTER — RX RENEWAL (OUTPATIENT)
Age: 35
End: 2025-01-24

## 2025-02-26 ENCOUNTER — RX RENEWAL (OUTPATIENT)
Age: 35
End: 2025-02-26

## 2025-02-27 ENCOUNTER — APPOINTMENT (OUTPATIENT)
Dept: ENDOCRINOLOGY | Facility: CLINIC | Age: 35
End: 2025-02-27
Payer: MEDICAID

## 2025-02-27 VITALS
OXYGEN SATURATION: 99 % | RESPIRATION RATE: 16 BRPM | BODY MASS INDEX: 25.01 KG/M2 | SYSTOLIC BLOOD PRESSURE: 132 MMHG | HEIGHT: 68 IN | DIASTOLIC BLOOD PRESSURE: 94 MMHG | WEIGHT: 165 LBS | HEART RATE: 89 BPM

## 2025-02-27 DIAGNOSIS — E78.2 MIXED HYPERLIPIDEMIA: ICD-10-CM

## 2025-02-27 DIAGNOSIS — E10.65 TYPE 1 DIABETES MELLITUS WITH HYPERGLYCEMIA: ICD-10-CM

## 2025-02-27 PROCEDURE — 99214 OFFICE O/P EST MOD 30 MIN: CPT

## 2025-02-27 PROCEDURE — 95251 CONT GLUC MNTR ANALYSIS I&R: CPT

## 2025-02-27 RX ORDER — GLUCAGON INJECTION, SOLUTION 1 MG/.2ML
1 INJECTION, SOLUTION SUBCUTANEOUS
Qty: 1 | Refills: 1 | Status: ACTIVE | COMMUNITY
Start: 2025-02-27 | End: 1900-01-01

## 2025-05-27 ENCOUNTER — RX RENEWAL (OUTPATIENT)
Age: 35
End: 2025-05-27

## 2025-05-29 ENCOUNTER — TRANSCRIPTION ENCOUNTER (OUTPATIENT)
Age: 35
End: 2025-05-29

## 2025-06-02 ENCOUNTER — TRANSCRIPTION ENCOUNTER (OUTPATIENT)
Age: 35
End: 2025-06-02

## 2025-07-18 ENCOUNTER — RX RENEWAL (OUTPATIENT)
Age: 35
End: 2025-07-18

## 2025-08-27 LAB
HBA1C MFR BLD HPLC: 6.9
LDLC SERPL DIRECT ASSAY-MCNC: 83
MICROALBUMIN/CREAT 24H UR-RTO: 16
TSH SERPL-ACNC: 0.6

## 2025-08-28 ENCOUNTER — APPOINTMENT (OUTPATIENT)
Dept: ENDOCRINOLOGY | Facility: CLINIC | Age: 35
End: 2025-08-28
Payer: MEDICAID

## 2025-08-28 ENCOUNTER — RX RENEWAL (OUTPATIENT)
Age: 35
End: 2025-08-28

## 2025-08-28 VITALS
BODY MASS INDEX: 24.55 KG/M2 | RESPIRATION RATE: 16 BRPM | DIASTOLIC BLOOD PRESSURE: 80 MMHG | WEIGHT: 162 LBS | SYSTOLIC BLOOD PRESSURE: 120 MMHG | TEMPERATURE: 98 F | OXYGEN SATURATION: 98 % | HEART RATE: 80 BPM | HEIGHT: 68 IN

## 2025-08-28 DIAGNOSIS — I10 ESSENTIAL (PRIMARY) HYPERTENSION: ICD-10-CM

## 2025-08-28 DIAGNOSIS — E78.2 MIXED HYPERLIPIDEMIA: ICD-10-CM

## 2025-08-28 DIAGNOSIS — E10.65 TYPE 1 DIABETES MELLITUS WITH HYPERGLYCEMIA: ICD-10-CM

## 2025-08-28 PROCEDURE — 99214 OFFICE O/P EST MOD 30 MIN: CPT

## 2025-08-28 PROCEDURE — 95251 CONT GLUC MNTR ANALYSIS I&R: CPT

## 2025-08-28 RX ORDER — INSULIN ASPART 100 [IU]/ML
100 INJECTION, SOLUTION INTRAVENOUS; SUBCUTANEOUS
Qty: 1 | Refills: 1 | Status: ACTIVE | COMMUNITY
Start: 2025-08-28 | End: 1900-01-01

## 2025-08-28 RX ORDER — PEN NEEDLE, DIABETIC 32GX 5/32"
32G X 4 MM NEEDLE, DISPOSABLE MISCELLANEOUS
Qty: 1 | Refills: 1 | Status: ACTIVE | COMMUNITY
Start: 2025-08-28 | End: 1900-01-01